# Patient Record
Sex: MALE | Race: WHITE | NOT HISPANIC OR LATINO | Employment: OTHER | URBAN - METROPOLITAN AREA
[De-identification: names, ages, dates, MRNs, and addresses within clinical notes are randomized per-mention and may not be internally consistent; named-entity substitution may affect disease eponyms.]

---

## 2018-12-11 PROBLEM — M1A.09X0 IDIOPATHIC CHRONIC GOUT OF MULTIPLE SITES WITHOUT TOPHUS: Status: ACTIVE | Noted: 2018-12-11

## 2019-01-11 PROBLEM — E03.9 ACQUIRED HYPOTHYROIDISM: Status: ACTIVE | Noted: 2019-01-11

## 2019-01-11 PROBLEM — I25.10 CORONARY ARTERY DISEASE INVOLVING NATIVE CORONARY ARTERY OF NATIVE HEART WITHOUT ANGINA PECTORIS: Status: ACTIVE | Noted: 2019-01-11

## 2019-01-11 PROBLEM — I10 BENIGN ESSENTIAL HTN: Status: ACTIVE | Noted: 2019-01-11

## 2019-01-11 PROBLEM — R74.8 LIVER ENZYME ELEVATION: Status: ACTIVE | Noted: 2019-01-11

## 2019-01-11 PROBLEM — R73.03 PREDIABETES: Status: ACTIVE | Noted: 2019-01-11

## 2020-11-07 PROBLEM — M1A.09X0 IDIOPATHIC CHRONIC GOUT OF MULTIPLE SITES WITHOUT TOPHUS: Chronic | Status: ACTIVE | Noted: 2018-12-11

## 2020-11-07 PROBLEM — I10 BENIGN ESSENTIAL HTN: Chronic | Status: ACTIVE | Noted: 2019-01-11

## 2020-11-07 PROBLEM — R80.9 PROTEINURIA: Chronic | Status: ACTIVE | Noted: 2020-11-07

## 2020-11-07 PROBLEM — I25.10 CORONARY ARTERY DISEASE INVOLVING NATIVE CORONARY ARTERY OF NATIVE HEART WITHOUT ANGINA PECTORIS: Chronic | Status: ACTIVE | Noted: 2019-01-11

## 2020-11-07 PROBLEM — E03.9 ACQUIRED HYPOTHYROIDISM: Chronic | Status: ACTIVE | Noted: 2019-01-11

## 2020-11-23 ENCOUNTER — TELEPHONE (OUTPATIENT)
Dept: NEPHROLOGY | Facility: CLINIC | Age: 65
End: 2020-11-23

## 2020-11-23 ENCOUNTER — LAB VISIT (OUTPATIENT)
Dept: LAB | Facility: HOSPITAL | Age: 65
End: 2020-11-23
Attending: INTERNAL MEDICINE
Payer: MEDICARE

## 2020-11-23 ENCOUNTER — OFFICE VISIT (OUTPATIENT)
Dept: NEPHROLOGY | Facility: CLINIC | Age: 65
End: 2020-11-23
Payer: MEDICARE

## 2020-11-23 VITALS
HEIGHT: 68 IN | WEIGHT: 306.25 LBS | HEART RATE: 59 BPM | DIASTOLIC BLOOD PRESSURE: 86 MMHG | BODY MASS INDEX: 46.41 KG/M2 | OXYGEN SATURATION: 96 % | SYSTOLIC BLOOD PRESSURE: 152 MMHG

## 2020-11-23 DIAGNOSIS — R80.9 PROTEINURIA, UNSPECIFIED TYPE: ICD-10-CM

## 2020-11-23 LAB
BACTERIA #/AREA URNS HPF: ABNORMAL /HPF
BILIRUB UR QL STRIP: NEGATIVE
CLARITY UR: CLEAR
COLOR UR: YELLOW
GLUCOSE UR QL STRIP: NEGATIVE
HGB UR QL STRIP: NEGATIVE
HYALINE CASTS #/AREA URNS LPF: 2 /LPF
KETONES UR QL STRIP: NEGATIVE
LEUKOCYTE ESTERASE UR QL STRIP: NEGATIVE
MICROSCOPIC COMMENT: ABNORMAL
NITRITE UR QL STRIP: NEGATIVE
PH UR STRIP: 6 [PH] (ref 5–8)
PROT UR QL STRIP: ABNORMAL
RBC #/AREA URNS HPF: 1 /HPF (ref 0–4)
SP GR UR STRIP: >=1.03 (ref 1–1.03)
URN SPEC COLLECT METH UR: ABNORMAL
WBC #/AREA URNS HPF: 2 /HPF (ref 0–5)

## 2020-11-23 PROCEDURE — 1100F PR PT FALLS ASSESS DOC 2+ FALLS/FALL W/INJURY/YR: ICD-10-PCS | Mod: CPTII,S$GLB,, | Performed by: INTERNAL MEDICINE

## 2020-11-23 PROCEDURE — 3288F FALL RISK ASSESSMENT DOCD: CPT | Mod: CPTII,S$GLB,, | Performed by: INTERNAL MEDICINE

## 2020-11-23 PROCEDURE — 81000 URINALYSIS NONAUTO W/SCOPE: CPT | Mod: PO

## 2020-11-23 PROCEDURE — 99999 PR PBB SHADOW E&M-NEW PATIENT-LVL III: CPT | Mod: PBBFAC,,, | Performed by: INTERNAL MEDICINE

## 2020-11-23 PROCEDURE — 99204 OFFICE O/P NEW MOD 45 MIN: CPT | Mod: S$GLB,,, | Performed by: INTERNAL MEDICINE

## 2020-11-23 PROCEDURE — 3079F DIAST BP 80-89 MM HG: CPT | Mod: CPTII,S$GLB,, | Performed by: INTERNAL MEDICINE

## 2020-11-23 PROCEDURE — 99999 PR PBB SHADOW E&M-NEW PATIENT-LVL III: ICD-10-PCS | Mod: PBBFAC,,, | Performed by: INTERNAL MEDICINE

## 2020-11-23 PROCEDURE — 84156 ASSAY OF PROTEIN URINE: CPT

## 2020-11-23 PROCEDURE — 1159F MED LIST DOCD IN RCRD: CPT | Mod: S$GLB,,, | Performed by: INTERNAL MEDICINE

## 2020-11-23 PROCEDURE — 3079F PR MOST RECENT DIASTOLIC BLOOD PRESSURE 80-89 MM HG: ICD-10-PCS | Mod: CPTII,S$GLB,, | Performed by: INTERNAL MEDICINE

## 2020-11-23 PROCEDURE — 3077F PR MOST RECENT SYSTOLIC BLOOD PRESSURE >= 140 MM HG: ICD-10-PCS | Mod: CPTII,S$GLB,, | Performed by: INTERNAL MEDICINE

## 2020-11-23 PROCEDURE — 1159F PR MEDICATION LIST DOCUMENTED IN MEDICAL RECORD: ICD-10-PCS | Mod: S$GLB,,, | Performed by: INTERNAL MEDICINE

## 2020-11-23 PROCEDURE — 3008F BODY MASS INDEX DOCD: CPT | Mod: CPTII,S$GLB,, | Performed by: INTERNAL MEDICINE

## 2020-11-23 PROCEDURE — 3077F SYST BP >= 140 MM HG: CPT | Mod: CPTII,S$GLB,, | Performed by: INTERNAL MEDICINE

## 2020-11-23 PROCEDURE — 99204 PR OFFICE/OUTPT VISIT, NEW, LEVL IV, 45-59 MIN: ICD-10-PCS | Mod: S$GLB,,, | Performed by: INTERNAL MEDICINE

## 2020-11-23 PROCEDURE — 3008F PR BODY MASS INDEX (BMI) DOCUMENTED: ICD-10-PCS | Mod: CPTII,S$GLB,, | Performed by: INTERNAL MEDICINE

## 2020-11-23 PROCEDURE — 1100F PTFALLS ASSESS-DOCD GE2>/YR: CPT | Mod: CPTII,S$GLB,, | Performed by: INTERNAL MEDICINE

## 2020-11-23 PROCEDURE — 3288F PR FALLS RISK ASSESSMENT DOCUMENTED: ICD-10-PCS | Mod: CPTII,S$GLB,, | Performed by: INTERNAL MEDICINE

## 2020-11-23 NOTE — PROGRESS NOTES
Subjective:       Patient ID: Scott Renteria is a 65 y.o. White male who presents for new patient evaluation for proteinuria.    Scott Renteria is referred by SCOTT Blunt Jr, MD to be evaluated for proteinuria.  He was found to have an elevated urine protein on his urine recently.  He admits that he is not much of a water drinker.  He has no uremic or urinary symptoms and is in his usual state of health.  There have been no recent illnesses, hospitalizations or procedures.  He does not regularly take NSAIDs.          Review of Systems   Constitutional: Negative for appetite change, chills and fever.   HENT: Negative for congestion.    Eyes: Negative for visual disturbance.   Respiratory: Negative for cough and shortness of breath.    Cardiovascular: Negative for chest pain and leg swelling.   Gastrointestinal: Negative for abdominal pain, diarrhea, nausea and vomiting.   Genitourinary: Negative for difficulty urinating, dysuria and hematuria.   Musculoskeletal: Positive for arthralgias (R knee). Negative for myalgias.   Skin: Negative for rash.   Neurological: Negative for headaches.   Psychiatric/Behavioral: Positive for sleep disturbance.       The past medical, family and social histories were reviewed for this encounter.     Past Medical History:   Diagnosis Date    Coronary artery disease     Gout     Hypertension     Hypothyroidism      Past Surgical History:   Procedure Laterality Date    CARDIAC CATHETERIZATION      CORONARY ARTERY BYPASS GRAFT      open heart surgery  2001    TONSILLECTOMY       Social History     Socioeconomic History    Marital status:      Spouse name: Not on file    Number of children: Not on file    Years of education: Not on file    Highest education level: Not on file   Occupational History    Not on file   Social Needs    Financial resource strain: Not on file    Food insecurity     Worry: Not on file     Inability: Not on file    Transportation needs      "Medical: Not on file     Non-medical: Not on file   Tobacco Use    Smoking status: Never Smoker    Smokeless tobacco: Never Used   Substance and Sexual Activity    Alcohol use: No    Drug use: Never    Sexual activity: Yes     Partners: Female     Birth control/protection: None   Lifestyle    Physical activity     Days per week: Not on file     Minutes per session: Not on file    Stress: Rather much   Relationships    Social connections     Talks on phone: Not on file     Gets together: Not on file     Attends Confucianism service: Not on file     Active member of club or organization: Not on file     Attends meetings of clubs or organizations: Not on file     Relationship status: Not on file   Other Topics Concern    Not on file   Social History Narrative    Not on file     Current Outpatient Medications   Medication Sig    NAPROXEN ORAL Take by mouth. Replaced ibuprofen    allopurinol (ZYLOPRIM) 300 MG tablet Take 2 po daily    clopidogrel (PLAVIX) 75 mg tablet Take 75 mg by mouth once daily.    colchicine (COLCRYS) 0.6 mg tablet 1 now and repeat in 2 hrs if necessary. Then 1 daily starting tomorrow.    diclofenac sodium (VOLTAREN) 1 % Gel Apply 2 g topically 2 (two) times daily.    dutasteride (AVODART) 0.5 mg capsule Take 0.5 mg by mouth once daily.    evolocumab (REPATHA PUSHTRONEX) 420 mg/3.5 mL Injt Inject into the skin every 21 days.     fosinopril (MONOPRIL) 20 MG tablet Take 20 mg by mouth once daily.    hydroCHLOROthiazide (HYDRODIURIL) 25 MG tablet Take 25 mg by mouth once daily.    levothyroxine (SYNTHROID) 50 MCG tablet Take 50 mcg by mouth once daily.    tamsulosin (FLOMAX) 0.4 mg Cap Take 1 capsule by mouth once daily.    testosterone (ANDROGEL) 20.25 mg/1.25 gram (1.62 %) GlPm Place 2 Pump onto the skin Daily.     No current facility-administered medications for this visit.        BP (!) 152/86   Pulse (!) 59   Ht 5' 8" (1.727 m)   Wt (!) 138.9 kg (306 lb 3.5 oz)   SpO2 96%   " BMI 46.56 kg/m²     Objective:      Physical Exam  Vitals signs reviewed.   Constitutional:       General: He is not in acute distress.     Appearance: He is well-developed.   HENT:      Head: Normocephalic and atraumatic.   Eyes:      General: No scleral icterus.     Conjunctiva/sclera: Conjunctivae normal.   Neck:      Musculoskeletal: Normal range of motion.      Vascular: No JVD.   Cardiovascular:      Rate and Rhythm: Normal rate and regular rhythm.      Heart sounds: Normal heart sounds. No murmur. No friction rub. No gallop.    Pulmonary:      Effort: Pulmonary effort is normal. No respiratory distress.      Breath sounds: Normal breath sounds. No wheezing or rales.   Abdominal:      General: Bowel sounds are normal. There is no distension.      Palpations: Abdomen is soft.      Tenderness: There is no abdominal tenderness.   Musculoskeletal:      Right lower leg: Edema (1-2+) present.      Left lower leg: Edema (1-2+) present.   Skin:     General: Skin is warm and dry.      Findings: No rash.   Neurological:      Mental Status: He is alert and oriented to person, place, and time.   Psychiatric:         Mood and Affect: Mood normal.         Behavior: Behavior normal.         Assessment:       1. Proteinuria, unspecified type        Plan:   Return to clinic in 6 months.  Labs for next visit include rp, upc, uric acid.  UA and UPC this week.  Baseline creatinine is 0.9.  Uric acid level is 5.9.  I plan to quantitate his urine protein.  If positive then I will get an additional reading to confirm.  His dipstick urine proteins may have been mildly positive due to the fact the specimens were concentrated.

## 2020-11-23 NOTE — TELEPHONE ENCOUNTER
----- Message from Cristóbal Cuvler sent at 11/23/2020  1:22 PM CST -----  Regarding: appt advice  Contact: pt  Type:  Patient Returning Call    Who Called:  pt  Does the patient know what this is regarding?:  please follow up with pt regarding appt dont feel can make appt is in another doctors office  Best Call Back Number:    Additional Information:  Thank you

## 2020-11-24 ENCOUNTER — TELEPHONE (OUTPATIENT)
Dept: NEPHROLOGY | Facility: CLINIC | Age: 65
End: 2020-11-24

## 2020-11-24 DIAGNOSIS — R80.9 PROTEINURIA, UNSPECIFIED TYPE: Primary | ICD-10-CM

## 2020-11-24 DIAGNOSIS — I10 BENIGN ESSENTIAL HTN: Chronic | ICD-10-CM

## 2020-11-24 LAB
CREAT UR-MCNC: 215 MG/DL (ref 23–375)
PROT UR-MCNC: 102 MG/DL (ref 0–15)
PROT/CREAT UR: 0.47 MG/G{CREAT} (ref 0–0.2)

## 2020-11-24 NOTE — TELEPHONE ENCOUNTER
Patient voiced understanding re: urine results and will repeat at his convenience in the next 2 weeks at [facility] or [facility] when he is well-hydrated.

## 2020-11-24 NOTE — TELEPHONE ENCOUNTER
----- Message from Crescencio Moran MD sent at 11/24/2020 10:23 AM CST -----  Let him know he has minimal protein in his urine.  I would like to repeat his UPC and UA after he is better hydrated.

## 2021-01-05 ENCOUNTER — TELEPHONE (OUTPATIENT)
Dept: NEPHROLOGY | Facility: CLINIC | Age: 66
End: 2021-01-05

## 2021-05-21 ENCOUNTER — LAB VISIT (OUTPATIENT)
Dept: LAB | Facility: HOSPITAL | Age: 66
End: 2021-05-21
Attending: INTERNAL MEDICINE
Payer: MEDICARE

## 2021-05-21 DIAGNOSIS — R80.9 PROTEINURIA, UNSPECIFIED TYPE: ICD-10-CM

## 2021-05-21 LAB
ALBUMIN SERPL BCP-MCNC: 3.6 G/DL (ref 3.5–5.2)
ANION GAP SERPL CALC-SCNC: 9 MMOL/L (ref 8–16)
BUN SERPL-MCNC: 20 MG/DL (ref 8–23)
CALCIUM SERPL-MCNC: 9.6 MG/DL (ref 8.7–10.5)
CHLORIDE SERPL-SCNC: 102 MMOL/L (ref 95–110)
CO2 SERPL-SCNC: 26 MMOL/L (ref 23–29)
CREAT SERPL-MCNC: 1.1 MG/DL (ref 0.5–1.4)
EST. GFR  (AFRICAN AMERICAN): >60 ML/MIN/1.73 M^2
EST. GFR  (NON AFRICAN AMERICAN): >60 ML/MIN/1.73 M^2
GLUCOSE SERPL-MCNC: 110 MG/DL (ref 70–110)
PHOSPHATE SERPL-MCNC: 3.1 MG/DL (ref 2.7–4.5)
POTASSIUM SERPL-SCNC: 4 MMOL/L (ref 3.5–5.1)
SODIUM SERPL-SCNC: 137 MMOL/L (ref 136–145)
URATE SERPL-MCNC: 5.4 MG/DL (ref 3.4–7)

## 2021-05-21 PROCEDURE — 84550 ASSAY OF BLOOD/URIC ACID: CPT | Performed by: INTERNAL MEDICINE

## 2021-05-21 PROCEDURE — 80069 RENAL FUNCTION PANEL: CPT | Performed by: INTERNAL MEDICINE

## 2021-05-21 PROCEDURE — 36415 COLL VENOUS BLD VENIPUNCTURE: CPT | Mod: PO | Performed by: INTERNAL MEDICINE

## 2021-05-25 ENCOUNTER — OFFICE VISIT (OUTPATIENT)
Dept: NEPHROLOGY | Facility: CLINIC | Age: 66
End: 2021-05-25
Payer: MEDICARE

## 2021-05-25 VITALS
BODY MASS INDEX: 46.97 KG/M2 | OXYGEN SATURATION: 94 % | SYSTOLIC BLOOD PRESSURE: 130 MMHG | DIASTOLIC BLOOD PRESSURE: 70 MMHG | HEART RATE: 65 BPM | WEIGHT: 309.94 LBS | HEIGHT: 68 IN

## 2021-05-25 DIAGNOSIS — R80.9 PROTEINURIA, UNSPECIFIED TYPE: Primary | ICD-10-CM

## 2021-05-25 DIAGNOSIS — I10 BENIGN ESSENTIAL HTN: ICD-10-CM

## 2021-05-25 PROCEDURE — 1159F PR MEDICATION LIST DOCUMENTED IN MEDICAL RECORD: ICD-10-PCS | Mod: S$GLB,,, | Performed by: INTERNAL MEDICINE

## 2021-05-25 PROCEDURE — 3078F DIAST BP <80 MM HG: CPT | Mod: CPTII,S$GLB,, | Performed by: INTERNAL MEDICINE

## 2021-05-25 PROCEDURE — 99214 PR OFFICE/OUTPT VISIT, EST, LEVL IV, 30-39 MIN: ICD-10-PCS | Mod: S$GLB,,, | Performed by: INTERNAL MEDICINE

## 2021-05-25 PROCEDURE — 3288F PR FALLS RISK ASSESSMENT DOCUMENTED: ICD-10-PCS | Mod: CPTII,S$GLB,, | Performed by: INTERNAL MEDICINE

## 2021-05-25 PROCEDURE — 3008F PR BODY MASS INDEX (BMI) DOCUMENTED: ICD-10-PCS | Mod: CPTII,S$GLB,, | Performed by: INTERNAL MEDICINE

## 2021-05-25 PROCEDURE — 1159F MED LIST DOCD IN RCRD: CPT | Mod: S$GLB,,, | Performed by: INTERNAL MEDICINE

## 2021-05-25 PROCEDURE — 3078F PR MOST RECENT DIASTOLIC BLOOD PRESSURE < 80 MM HG: ICD-10-PCS | Mod: CPTII,S$GLB,, | Performed by: INTERNAL MEDICINE

## 2021-05-25 PROCEDURE — 99214 OFFICE O/P EST MOD 30 MIN: CPT | Mod: S$GLB,,, | Performed by: INTERNAL MEDICINE

## 2021-05-25 PROCEDURE — 99999 PR PBB SHADOW E&M-EST. PATIENT-LVL III: CPT | Mod: PBBFAC,,, | Performed by: INTERNAL MEDICINE

## 2021-05-25 PROCEDURE — 1101F PT FALLS ASSESS-DOCD LE1/YR: CPT | Mod: CPTII,S$GLB,, | Performed by: INTERNAL MEDICINE

## 2021-05-25 PROCEDURE — 99999 PR PBB SHADOW E&M-EST. PATIENT-LVL III: ICD-10-PCS | Mod: PBBFAC,,, | Performed by: INTERNAL MEDICINE

## 2021-05-25 PROCEDURE — 3008F BODY MASS INDEX DOCD: CPT | Mod: CPTII,S$GLB,, | Performed by: INTERNAL MEDICINE

## 2021-05-25 PROCEDURE — 3288F FALL RISK ASSESSMENT DOCD: CPT | Mod: CPTII,S$GLB,, | Performed by: INTERNAL MEDICINE

## 2021-05-25 PROCEDURE — 3075F PR MOST RECENT SYSTOLIC BLOOD PRESS GE 130-139MM HG: ICD-10-PCS | Mod: CPTII,S$GLB,, | Performed by: INTERNAL MEDICINE

## 2021-05-25 PROCEDURE — 3075F SYST BP GE 130 - 139MM HG: CPT | Mod: CPTII,S$GLB,, | Performed by: INTERNAL MEDICINE

## 2021-05-25 PROCEDURE — 1101F PR PT FALLS ASSESS DOC 0-1 FALLS W/OUT INJ PAST YR: ICD-10-PCS | Mod: CPTII,S$GLB,, | Performed by: INTERNAL MEDICINE

## 2021-05-25 RX ORDER — LEVOTHYROXINE SODIUM 100 UG/1
100 TABLET ORAL DAILY
COMMUNITY
Start: 2021-03-11 | End: 2022-07-29 | Stop reason: DRUGHIGH

## 2021-05-25 RX ORDER — EVOLOCUMAB 140 MG/ML
INJECTION, SOLUTION SUBCUTANEOUS
COMMUNITY
Start: 2021-05-03

## 2022-02-21 ENCOUNTER — LAB VISIT (OUTPATIENT)
Dept: LAB | Facility: HOSPITAL | Age: 67
End: 2022-02-21
Attending: INTERNAL MEDICINE
Payer: MEDICARE

## 2022-02-21 DIAGNOSIS — R80.9 PROTEINURIA, UNSPECIFIED TYPE: ICD-10-CM

## 2022-02-21 LAB
CREAT UR-MCNC: 189 MG/DL (ref 23–375)
PROT UR-MCNC: 93 MG/DL (ref 0–15)
PROT/CREAT UR: 0.49 MG/G{CREAT} (ref 0–0.2)

## 2022-02-21 PROCEDURE — 82570 ASSAY OF URINE CREATININE: CPT | Performed by: INTERNAL MEDICINE

## 2022-02-23 ENCOUNTER — TELEPHONE (OUTPATIENT)
Dept: NEPHROLOGY | Facility: CLINIC | Age: 67
End: 2022-02-23
Payer: MEDICARE

## 2022-02-23 NOTE — TELEPHONE ENCOUNTER
----- Message from Tiffanie Jain sent at 2/23/2022 11:15 AM CST -----  Contact: SAMY MACHADO [[id number]] [phone]  GENERAL CALL BACK    Pt wants to reschedule his 9-month follow up that was originally scheduled for 2/28/22. He'd like to reschedule for that same day, but later in the afternoon, and requests a phone call visit (audio only) if possible. [facility] and does not want to set [facility] up. He is awaiting a phone call back at [phone] with whether his request can be accommodated.

## 2022-07-06 PROBLEM — N40.1 BENIGN PROSTATIC HYPERPLASIA WITH URINARY HESITANCY: Chronic | Status: ACTIVE | Noted: 2022-07-06

## 2022-07-06 PROBLEM — R73.03 PREDIABETES: Chronic | Status: ACTIVE | Noted: 2019-01-11

## 2022-07-06 PROBLEM — R39.11 BENIGN PROSTATIC HYPERPLASIA WITH URINARY HESITANCY: Chronic | Status: ACTIVE | Noted: 2022-07-06

## 2022-09-27 ENCOUNTER — TELEPHONE (OUTPATIENT)
Dept: GASTROENTEROLOGY | Facility: CLINIC | Age: 67
End: 2022-09-27
Payer: MEDICARE

## 2022-09-27 NOTE — TELEPHONE ENCOUNTER
Received referral to schedule pt appt in clinic from NP. Called pt and scheduled f/u and scheduled c-scope. Prep mailed to pts house. Pt verbalized understanding

## 2022-10-26 ENCOUNTER — TELEPHONE (OUTPATIENT)
Dept: GASTROENTEROLOGY | Facility: CLINIC | Age: 67
End: 2022-10-26
Payer: MEDICARE

## 2022-10-26 NOTE — TELEPHONE ENCOUNTER
Left message for pt to return call to clinic to discuss rescheduling procedure to this year. Number provided.

## 2022-10-26 NOTE — TELEPHONE ENCOUNTER
----- Message from Rebeka Alex sent at 10/26/2022  9:07 AM CDT -----  Type: Needs Medical Advice  Who Called:  pt  Symptoms (please be specific):  pt said he need someone in the call him he need to reschedule his colonoscopy--he need to have the procedure done before the year is out--please call and advise   Best Call Back Number: [phone] (home)     Additional Information: thank you

## 2022-10-26 NOTE — TELEPHONE ENCOUNTER
Spoke with pt. Pt is going to contact his insurance before rescheduling. If he needs to reschedule, he will call back.

## 2022-12-21 PROBLEM — G47.33 OSA (OBSTRUCTIVE SLEEP APNEA): Status: ACTIVE | Noted: 2022-12-21

## 2022-12-21 PROBLEM — E66.01 MORBID OBESITY: Status: ACTIVE | Noted: 2018-07-23

## 2022-12-29 ENCOUNTER — TELEPHONE (OUTPATIENT)
Dept: SURGERY | Facility: HOSPITAL | Age: 67
End: 2022-12-29
Payer: MEDICARE

## 2022-12-29 NOTE — TELEPHONE ENCOUNTER
Good morning,     Mr. Renteria is currently ill and would like to reschedule his colonoscopy with Dr. Mayorga on 1/3/23. Please contact him regarding rescheduling.     Thank you!

## 2022-12-29 NOTE — TELEPHONE ENCOUNTER
Left message for pt to return call to clinic to reschedule procedure once he is feeling better. Number provided.

## 2023-01-05 ENCOUNTER — TELEPHONE (OUTPATIENT)
Dept: GASTROENTEROLOGY | Facility: CLINIC | Age: 68
End: 2023-01-05
Payer: MEDICARE

## 2023-01-05 NOTE — TELEPHONE ENCOUNTER
Spoke with pt. Rescheduled procedure. Pt still had a copy of his prep instructions. Pt informed to hold plavix 5 days prior. Pt verbalized understanding to all.

## 2023-01-16 PROBLEM — Z95.1 HX OF CABG: Status: ACTIVE | Noted: 2023-01-16

## 2023-01-16 PROBLEM — E78.00 PURE HYPERCHOLESTEROLEMIA: Status: ACTIVE | Noted: 2023-01-16

## 2023-01-31 ENCOUNTER — HOSPITAL ENCOUNTER (OUTPATIENT)
Facility: HOSPITAL | Age: 68
Discharge: HOME | End: 2023-01-31
Attending: INTERNAL MEDICINE | Admitting: INTERNAL MEDICINE
Payer: MEDICARE

## 2023-01-31 ENCOUNTER — ANESTHESIA EVENT (OUTPATIENT)
Dept: ENDOSCOPY | Facility: HOSPITAL | Age: 68
End: 2023-01-31
Payer: MEDICARE

## 2023-01-31 ENCOUNTER — ANESTHESIA (OUTPATIENT)
Dept: ENDOSCOPY | Facility: HOSPITAL | Age: 68
End: 2023-01-31
Payer: MEDICARE

## 2023-01-31 ENCOUNTER — TELEPHONE (OUTPATIENT)
Dept: GASTROENTEROLOGY | Facility: CLINIC | Age: 68
End: 2023-01-31
Payer: MEDICARE

## 2023-01-31 VITALS
TEMPERATURE: 98 F | BODY MASS INDEX: 40.92 KG/M2 | SYSTOLIC BLOOD PRESSURE: 133 MMHG | RESPIRATION RATE: 16 BRPM | HEART RATE: 50 BPM | HEIGHT: 68 IN | OXYGEN SATURATION: 95 % | DIASTOLIC BLOOD PRESSURE: 65 MMHG | WEIGHT: 270 LBS

## 2023-01-31 DIAGNOSIS — Z12.11 SCREEN FOR COLON CANCER: ICD-10-CM

## 2023-01-31 PROCEDURE — 37000008 HC ANESTHESIA 1ST 15 MINUTES: Mod: PO | Performed by: INTERNAL MEDICINE

## 2023-01-31 PROCEDURE — 27201089 HC SNARE, DISP (ANY): Mod: PO | Performed by: INTERNAL MEDICINE

## 2023-01-31 PROCEDURE — 25000003 PHARM REV CODE 250: Mod: PO | Performed by: NURSE ANESTHETIST, CERTIFIED REGISTERED

## 2023-01-31 PROCEDURE — 63600175 PHARM REV CODE 636 W HCPCS: Mod: PO | Performed by: NURSE ANESTHETIST, CERTIFIED REGISTERED

## 2023-01-31 PROCEDURE — 45385 PR COLONOSCOPY,REMV LESN,SNARE: ICD-10-PCS | Mod: PT,,, | Performed by: INTERNAL MEDICINE

## 2023-01-31 PROCEDURE — 45380 COLONOSCOPY AND BIOPSY: CPT | Mod: PT,59,PO | Performed by: INTERNAL MEDICINE

## 2023-01-31 PROCEDURE — 45385 COLONOSCOPY W/LESION REMOVAL: CPT | Mod: PT,,, | Performed by: INTERNAL MEDICINE

## 2023-01-31 PROCEDURE — 45385 COLONOSCOPY W/LESION REMOVAL: CPT | Mod: PT,PO | Performed by: INTERNAL MEDICINE

## 2023-01-31 PROCEDURE — 45380 COLONOSCOPY AND BIOPSY: CPT | Mod: PT,59,, | Performed by: INTERNAL MEDICINE

## 2023-01-31 PROCEDURE — 27201012 HC FORCEPS, HOT/COLD, DISP: Mod: PO | Performed by: INTERNAL MEDICINE

## 2023-01-31 PROCEDURE — D9220A PRA ANESTHESIA: Mod: PT,CRNA,, | Performed by: NURSE ANESTHETIST, CERTIFIED REGISTERED

## 2023-01-31 PROCEDURE — 63600175 PHARM REV CODE 636 W HCPCS: Mod: PO | Performed by: INTERNAL MEDICINE

## 2023-01-31 PROCEDURE — D9220A PRA ANESTHESIA: Mod: PT,ANES,, | Performed by: ANESTHESIOLOGY

## 2023-01-31 PROCEDURE — 88305 TISSUE EXAM BY PATHOLOGIST: CPT | Mod: 26,,, | Performed by: PATHOLOGY

## 2023-01-31 PROCEDURE — 88305 TISSUE EXAM BY PATHOLOGIST: CPT | Performed by: PATHOLOGY

## 2023-01-31 PROCEDURE — 88305 TISSUE EXAM BY PATHOLOGIST: ICD-10-PCS | Mod: 26,,, | Performed by: PATHOLOGY

## 2023-01-31 PROCEDURE — 37000009 HC ANESTHESIA EA ADD 15 MINS: Mod: PO | Performed by: INTERNAL MEDICINE

## 2023-01-31 PROCEDURE — D9220A PRA ANESTHESIA: ICD-10-PCS | Mod: PT,ANES,, | Performed by: ANESTHESIOLOGY

## 2023-01-31 PROCEDURE — D9220A PRA ANESTHESIA: ICD-10-PCS | Mod: PT,CRNA,, | Performed by: NURSE ANESTHETIST, CERTIFIED REGISTERED

## 2023-01-31 PROCEDURE — 45380 PR COLONOSCOPY,BIOPSY: ICD-10-PCS | Mod: PT,59,, | Performed by: INTERNAL MEDICINE

## 2023-01-31 RX ORDER — SODIUM CHLORIDE 0.9 % (FLUSH) 0.9 %
10 SYRINGE (ML) INJECTION
Status: DISCONTINUED | OUTPATIENT
Start: 2023-01-31 | End: 2023-01-31 | Stop reason: HOSPADM

## 2023-01-31 RX ORDER — PROPOFOL 10 MG/ML
VIAL (ML) INTRAVENOUS
Status: DISCONTINUED | OUTPATIENT
Start: 2023-01-31 | End: 2023-01-31

## 2023-01-31 RX ORDER — LIDOCAINE HCL/PF 100 MG/5ML
SYRINGE (ML) INTRAVENOUS
Status: DISCONTINUED | OUTPATIENT
Start: 2023-01-31 | End: 2023-01-31

## 2023-01-31 RX ORDER — SODIUM CHLORIDE, SODIUM LACTATE, POTASSIUM CHLORIDE, CALCIUM CHLORIDE 600; 310; 30; 20 MG/100ML; MG/100ML; MG/100ML; MG/100ML
INJECTION, SOLUTION INTRAVENOUS CONTINUOUS
Status: DISCONTINUED | OUTPATIENT
Start: 2023-01-31 | End: 2023-01-31 | Stop reason: HOSPADM

## 2023-01-31 RX ADMIN — SODIUM CHLORIDE, POTASSIUM CHLORIDE, SODIUM LACTATE AND CALCIUM CHLORIDE: 600; 310; 30; 20 INJECTION, SOLUTION INTRAVENOUS at 10:01

## 2023-01-31 RX ADMIN — LIDOCAINE HYDROCHLORIDE 100 MG: 20 INJECTION, SOLUTION INTRAVENOUS at 10:01

## 2023-01-31 RX ADMIN — PROPOFOL 30 MG: 10 INJECTION, EMULSION INTRAVENOUS at 10:01

## 2023-01-31 RX ADMIN — PROPOFOL 30 MG: 10 INJECTION, EMULSION INTRAVENOUS at 11:01

## 2023-01-31 RX ADMIN — PROPOFOL 90 MG: 10 INJECTION, EMULSION INTRAVENOUS at 10:01

## 2023-01-31 NOTE — TRANSFER OF CARE
"Anesthesia Transfer of Care Note    Patient: Scott Renteria    Procedure(s) Performed: Procedure(s) (LRB):  COLONOSCOPY (N/A)    Patient location: PACU    Anesthesia Type: general    Transport from OR: Transported from OR on 2-3 L/min O2 by NC with adequate spontaneous ventilation    Post pain: adequate analgesia    Post assessment: no apparent anesthetic complications and tolerated procedure well    Post vital signs: stable    Level of consciousness: awake    Nausea/Vomiting: no nausea/vomiting    Complications: none    Transfer of care protocol was followed      Last vitals:   Visit Vitals  BP (!) 113/59   Pulse 61   Temp 36.9 °C (98.4 °F) (Skin)   Resp 18   Ht 5' 8" (1.727 m)   Wt 122.5 kg (270 lb)   SpO2 97%   BMI 41.05 kg/m²     "

## 2023-01-31 NOTE — ANESTHESIA POSTPROCEDURE EVALUATION
Anesthesia Post Evaluation    Patient: Scott Renteria    Procedure(s) Performed: Procedure(s) (LRB):  COLONOSCOPY (N/A)    Final Anesthesia Type: general      Patient location during evaluation: PACU  Patient participation: Yes- Able to Participate  Level of consciousness: awake and alert and oriented  Post-procedure vital signs: reviewed and stable  Pain management: adequate  Airway patency: patent    PONV status at discharge: No PONV  Anesthetic complications: no      Cardiovascular status: blood pressure returned to baseline  Respiratory status: unassisted, spontaneous ventilation and room air  Hydration status: euvolemic  Follow-up not needed.          Vitals Value Taken Time   /65 01/31/23 1132   Temp 36.9 °C (98.4 °F) 01/31/23 1117   Pulse 50 01/31/23 1132   Resp 16 01/31/23 1132   SpO2 95 % 01/31/23 1132         Event Time   Out of Recovery 11:46:56         Pain/Shira Score: Shira Score: 10 (1/31/2023 11:35 AM)

## 2023-01-31 NOTE — ANESTHESIA PREPROCEDURE EVALUATION
01/31/2023  Scott Renteria is a 67 y.o., male.      Pre-op Assessment    I have reviewed the Patient Summary Reports.     I have reviewed the Nursing Notes. I have reviewed the NPO Status.   I have reviewed the Medications.     Review of Systems  Cardiovascular:   Hypertension CAD asymptomatic CABG/stent     Pulmonary:   Sleep Apnea    Endocrine:   Hypothyroidism        Physical Exam  General: Well nourished, Cooperative, Alert and Oriented  MORBIDLY OBESE    Airway:  Mallampati: II / II  Mouth Opening: Normal  TM Distance: 4 - 6 cm  Tongue: Normal    Dental:  Intact    Chest/Lungs:  Clear to auscultation, Normal Respiratory Rate    Heart:  Rate: Normal  Rhythm: Regular Rhythm  Sounds: Normal        Anesthesia Plan  Type of Anesthesia, risks & benefits discussed:    Anesthesia Type: Gen Natural Airway  Intra-op Monitoring Plan: Standard ASA Monitors  Induction:  IV  Informed Consent: Informed consent signed with the Patient and all parties understand the risks and agree with anesthesia plan.  All questions answered.   ASA Score: 3    Ready For Surgery From Anesthesia Perspective.     .

## 2023-01-31 NOTE — H&P
"History & Physical - Short Stay  Gastroenterology      SUBJECTIVE:     Procedure: Colonoscopy    Chief Complaint/Indication for Procedure: Screening    PTA Medications   Medication Sig    allopurinol (ZYLOPRIM) 300 MG tablet Take 2 po daily    clopidogrel (PLAVIX) 75 mg tablet Take 75 mg by mouth once daily.    fosinopril (MONOPRIL) 20 MG tablet Take 20 mg by mouth once daily.    hydroCHLOROthiazide (HYDRODIURIL) 25 MG tablet Take 25 mg by mouth once daily.    levothyroxine (SYNTHROID) 125 MCG tablet TAKE 1 TABLET BY MOUTH BEFORE BREAKFAST.    REPATHA SYRINGE 140 mg/mL Syrg INJECT 1 ML EVERY 3 WEEKS    tamsulosin (FLOMAX) 0.4 mg Cap Take 1 capsule by mouth once daily.    tirzepatide (MOUNJARO) 2.5 mg/0.5 mL PnIj Inject 2.5 mg into the skin every 7 days.    BD ULTRA-FINE MICRO PEN NEEDLE 32 gauge x 1/4" Ndle USE AS DIRECTED WEEKLY WITH OZEMPIC    colchicine (COLCRYS) 0.6 mg tablet 1 now and repeat in 2 hrs if necessary. Then 1 daily starting tomorrow.    diclofenac sodium (VOLTAREN) 1 % Gel Apply 2 g topically 2 (two) times daily.    doxycycline (VIBRAMYCIN) 100 MG Cap Take 1 capsule (100 mg total) by mouth every 12 (twelve) hours.    dutasteride (AVODART) 0.5 mg capsule Take 0.5 mg by mouth once daily.    ondansetron (ZOFRAN-ODT) 8 MG TbDL TAKE 1 TABLET BY MOUTH EVERY 8 HOURS FOR NAUSEA AND VOMITING    testosterone (ANDROGEL) 20.25 mg/1.25 gram (1.62 %) GlPm Place 2 Pump onto the skin Daily.       Review of patient's allergies indicates:   Allergen Reactions    Statins-hmg-coa reductase inhibitors      Myalgia; pt now on Repatha        Past Medical History:   Diagnosis Date    Coronary artery disease     Gout     Hypertension     Hypothyroidism     HARDEEP (obstructive sleep apnea) 12/21/2022     Past Surgical History:   Procedure Laterality Date    CARDIAC CATHETERIZATION      CORONARY ARTERY BYPASS GRAFT      open heart surgery  2001    TONSILLECTOMY       Family History   Problem Relation Age of Onset    Heart " disease Mother         stents    Thyroid disease Mother     Alcohol abuse Father      Social History     Tobacco Use    Smoking status: Never    Smokeless tobacco: Never   Substance Use Topics    Alcohol use: No    Drug use: Never         OBJECTIVE:     Vital Signs (Most Recent)       Physical Exam                                                        GENERAL:  Comfortable, in no acute distress.                                 HEENT EXAM:  Nonicteric.  No adenopathy.  Oropharynx is clear.               NECK:  Supple.                                                               LUNGS:  Clear.                                                               CARDIAC:  Regular rate and rhythm.  S1, S2.  No murmur.                      ABDOMEN:  Soft, positive bowel sounds, nontender.  No hepatosplenomegaly or masses.  No rebound or guarding.                                             EXTREMITIES:  No edema.     MENTAL STATUS:  Normal, alert and oriented.      ASSESSMENT/PLAN:     Assessment: Colorectal cancer screening    Plan: Colonoscopy    Anesthesia Plan: General    ASA Grade: ASA 2 - Patient with mild systemic disease with no functional limitations    MALLAMPATI SCORE:  I (soft palate, uvula, fauces, and tonsillar pillars visible)

## 2023-01-31 NOTE — TELEPHONE ENCOUNTER
----- Message from Keri Stout sent at 1/31/2023  8:56 AM CST -----  Contact: Wife  Patient is scheduled for a c-scope this morning and was calling to see if there is anything he can or can't take for a headache. Can we please call him back at [phone] to advise ASAP. Thank You.

## 2023-01-31 NOTE — PROVATION PATIENT INSTRUCTIONS
Discharge Summary/Instructions after an Endoscopic Procedure  Patient Name: Scott Renteria  Patient MRN: [id number]  Patient YOB: 1955 Tuesday, January 31, 2023  Brett Mayorga MD  Dear patient,  As a result of recent federal legislation (The Federal Cures Act), you may   receive lab or pathology results from your procedure in your [facility]   account before your physician is able to contact you. Your physician or   their representative will relay the results to you with their   recommendations at their soonest availability.  Thank you,  RESTRICTIONS:  During your procedure today, you received medications for sedation.  These   medications may affect your judgment, balance and coordination.  Therefore,   for 24 hours, you have the following restrictions:   - DO NOT drive a car, operate machinery, make legal/financial decisions,   sign important papers or drink alcohol.    ACTIVITY:  Today: no heavy lifting, straining or running due to procedural   sedation/anesthesia.  The following day: return to full activity including work.  DIET:  Eat and drink normally unless instructed otherwise.     TREATMENT FOR COMMON SIDE EFFECTS:  - Mild abdominal pain, nausea, belching, bloating or excessive gas:  rest,   eat lightly and use a heating pad.  - Sore Throat: treat with throat lozenges and/or gargle with warm salt   water.  - Because air was used during the procedure, expelling large amounts of air   from your rectum or belching is normal.  - If a bowel prep was taken, you may not have a bowel movement for 1-3 days.    This is normal.  SYMPTOMS TO WATCH FOR AND REPORT TO YOUR PHYSICIAN:  1. Abdominal pain or bloating, other than gas cramps.  2. Chest pain.  3. Back pain.  4. Signs of infection such as: chills or fever occurring within 24 hours   after the procedure.  5. Rectal bleeding, which would show as bright red, maroon, or black stools.   (A tablespoon of blood from the rectum is not serious, especially  if   hemorrhoids are present.)  6. Vomiting.  7. Weakness or dizziness.  GO DIRECTLY TO THE NEAREST EMERGENCY ROOM IF YOU HAVE ANY OF THE FOLLOWING:      Difficulty breathing              Chills and/or fever over 101 F   Persistent vomiting and/or vomiting blood   Severe abdominal pain   Severe chest pain   Black, tarry stools   Bleeding- more than one tablespoon   Any other symptom or condition that you feel may need urgent attention  Your doctor recommends these additional instructions:  If any biopsies were taken, your doctors clinic will contact you in 1 to 2   weeks with any results.  We are waiting for your pathology results.   Your physician has recommended a repeat colonoscopy in five years for   surveillance based on pathology results.   You are being discharged to home.  For questions, problems or results please call your physician - Brett Mayorga MD at Work:  [phone].  EMERGENCY PHONE NUMBER: [phone], LAB RESULTS: [phone]  IF A COMPLICATION OR EMERGENCY SITUATION ARISES AND YOU ARE UNABLE TO REACH   YOUR PHYSICIAN - GO DIRECTLY TO THE EMERGENCY ROOM.  ___________________________________________  Nurse Signature  ___________________________________________  Patient/Designated Responsible Party Signature  Brett Mayorga MD  1/31/2023 11:17:26 AM  This report has been verified and signed electronically.  Dear patient,  As a result of recent federal legislation (The Federal Cures Act), you may   receive lab or pathology results from your procedure in your [facility]   account before your physician is able to contact you. Your physician or   their representative will relay the results to you with their   recommendations at their soonest availability.  Thank you.  PROVATION

## 2023-02-07 LAB
FINAL PATHOLOGIC DIAGNOSIS: NORMAL
GROSS: NORMAL
Lab: NORMAL

## 2024-09-15 PROBLEM — E66.01 MORBID OBESITY: Chronic | Status: ACTIVE | Noted: 2018-07-23

## 2024-09-15 PROBLEM — G47.33 OSA (OBSTRUCTIVE SLEEP APNEA): Chronic | Status: ACTIVE | Noted: 2022-12-21

## 2025-06-12 PROBLEM — R19.7 VOMITING AND DIARRHEA: Status: ACTIVE | Noted: 2025-06-12

## 2025-06-12 PROBLEM — I95.1 ORTHOSTATIC HYPOTENSION: Status: ACTIVE | Noted: 2025-06-12

## 2025-06-12 PROBLEM — R11.10 VOMITING AND DIARRHEA: Status: ACTIVE | Noted: 2025-06-12

## 2025-06-19 ENCOUNTER — TELEPHONE (OUTPATIENT)
Dept: NEUROLOGY | Facility: CLINIC | Age: 70
End: 2025-06-19
Payer: MEDICARE

## 2025-06-26 ENCOUNTER — OFFICE VISIT (OUTPATIENT)
Dept: NEUROLOGY | Facility: CLINIC | Age: 70
End: 2025-06-26
Payer: MEDICARE

## 2025-06-26 VITALS
RESPIRATION RATE: 14 BRPM | DIASTOLIC BLOOD PRESSURE: 70 MMHG | WEIGHT: 265.75 LBS | BODY MASS INDEX: 40.41 KG/M2 | SYSTOLIC BLOOD PRESSURE: 122 MMHG

## 2025-06-26 DIAGNOSIS — R55 SYNCOPE AND COLLAPSE: Primary | ICD-10-CM

## 2025-06-26 DIAGNOSIS — R42 DIZZINESS: ICD-10-CM

## 2025-06-26 DIAGNOSIS — R26.89 IMBALANCE: ICD-10-CM

## 2025-06-26 PROCEDURE — 4010F ACE/ARB THERAPY RXD/TAKEN: CPT | Mod: CPTII,S$GLB,,

## 2025-06-26 PROCEDURE — 3008F BODY MASS INDEX DOCD: CPT | Mod: CPTII,S$GLB,,

## 2025-06-26 PROCEDURE — 3074F SYST BP LT 130 MM HG: CPT | Mod: CPTII,S$GLB,,

## 2025-06-26 PROCEDURE — 3078F DIAST BP <80 MM HG: CPT | Mod: CPTII,S$GLB,,

## 2025-06-26 PROCEDURE — 99204 OFFICE O/P NEW MOD 45 MIN: CPT | Mod: S$GLB,,,

## 2025-06-26 PROCEDURE — 1159F MED LIST DOCD IN RCRD: CPT | Mod: CPTII,S$GLB,,

## 2025-06-26 PROCEDURE — 3044F HG A1C LEVEL LT 7.0%: CPT | Mod: CPTII,S$GLB,,

## 2025-06-26 PROCEDURE — 1126F AMNT PAIN NOTED NONE PRSNT: CPT | Mod: CPTII,S$GLB,,

## 2025-06-26 PROCEDURE — 1100F PTFALLS ASSESS-DOCD GE2>/YR: CPT | Mod: CPTII,S$GLB,,

## 2025-06-26 PROCEDURE — 99999 PR PBB SHADOW E&M-EST. PATIENT-LVL III: CPT | Mod: PBBFAC,,,

## 2025-06-26 PROCEDURE — 3288F FALL RISK ASSESSMENT DOCD: CPT | Mod: CPTII,S$GLB,,

## 2025-06-26 NOTE — PROGRESS NOTES
NEUROLOGY  Outpatient Consultation Visit   [facility]  [address] [facility], [place] [address]  [phone] (office) / [phone] (fax)    Patient Name:  Scott Renteria  :  1955  MR #:  [id number]  Acct #:  [id number]    Date of Neurology Consult: 2025  Name of Provider: GUNNAR Valdovinos    Other Physicians:  SCOTT Blunt Jr., MD (Primary Care Physician); Self, Aaareferral (Referring)      Chief Complaint: Hospital Follow Up      History of Present Illness (HPI):  Scott Renteria is a 70 y.o. male with a PMHX of CAD s/p CABG, gout, HTN, hypothyroidism, pre diabetes, BPH, and  HARDEEP. Patient is here with wife who also contributes to the following history. Patient presents to clinic for evaluation of dizziness.     Patient was hospitalized [facility] following a syncopal episode.  He was receiving semaglutide from compounded pharmacy and increased this dose.  He was having multiple episodes of nausea and vomiting for approximately 24 hours, he tried to go to the bathroom to vomit and passed out.  He did hit his head.  EMS was called and he was taken to the ER. He was found to have right parieto-occipital scalp soft tissue swelling and hematoma.  His Plavix was held for 5 days and has since been resumed.  He denies any further dizziness, vision changes, speech changes, or altered mental status.  His staples were removed.  He has stopped semaglutide.     Inpatient workup revealed elevated troponins which were suspected to be secondary to severe dehydration and improved with IV rehydration. He was evaluated by Cardiology in the hospital. Orthostatics were positive.  He has chronic lightheadedness with position changes. He has been recommended conservative measures for orthostasis. He has a follow up with Cardiology.     He had a fall due a syncopal episode. He had a fall several years ago. Denies imbalance.     He was able to work out this morning.     Hospital Course per D/C  Summary 6/13/2025:      Mr. Scott Renteria was admitted to the Clinical Decision Unit (CDU) for further observation and medical management following recent episodes of nausea, vomiting, and generalized weakness. The patient reported a recent dosage increase in his semaglutide injections, which was suspected to have contributed to his symptoms.  During his stay, he was treated with intravenous fluid resuscitation and antiemetics, with gradual clinical improvement. However, serial cardiac enzymes revealed trending elevations in high-sensitivity troponins. Inpatient Cardiology was consulted, and after thorough evaluation, no evidence of acute ischemia or arrhythmia was identified. It was determined that the troponin elevation was likely due to demand ischemia or dehydration-related cardiac strain, as levels began to decline with adequate hydration.  A transthoracic echocardiogram was performed and reviewed by Dr. Muir, who found no acute abnormalities. Mr. Renteria remained hemodynamically stable, and his symptoms resolved. He was medically cleared for discharge and instructed to resume his home medications. He was highly recommended to stop or reduce his semaglutide medication.   He will follow up with his primary Cardiologist within one week. He will follow up with his PCP in 5 days for staple removal of scalp laceration. At the time of discharge, Mr. Renteria is alert, ambulatory, and without fall risk. He is safe for discharge to home.    Past Medical, Surgical, Family & Social History:   Past Medical History:   Diagnosis Date    Coronary artery disease     Gout     Hypertension     Hypothyroidism     HARDEEP (obstructive sleep apnea) 12/21/2022     Past Surgical History:   Procedure Laterality Date    CARDIAC CATHETERIZATION      COLONOSCOPY N/A 1/31/2023    Procedure: COLONOSCOPY;  Surgeon: Brett Mayorga MD;  Location: [facility];  Service: Endoscopy;  Laterality: N/A;    CORONARY ARTERY BYPASS GRAFT      open  heart surgery  2001    TONSILLECTOMY       Family History   Problem Relation Name Age of Onset    Heart disease Mother          stents    Thyroid disease Mother      Alcohol abuse Father       Alcohol use:  reports no history of alcohol use.   (Of note, 0.6 oz = 1 beer or 6 oz = 10 beers).  Tobacco use:  reports that he has never smoked. He has never used smokeless tobacco.  Street drug use:  reports no history of drug use.  Allergies: Statins-hmg-coa reductase inhibitors.    Home Medications:   Current Medications[1]    Physical Examination:  /70 (BP Location: Right arm, Patient Position: Sitting)   Resp 14   Wt 120.5 kg (265 lb 12.2 oz)   BMI 40.41 kg/m²     Neurological exam:  Mental status: Awake and alert.    Speech/Language: Fluent and appropriate. No dysarthria or aphasia on conversation. Able to follow complex commands.   Cranial nerves (II-XII): Visual fields full. Pupils equal round and reactive to light, extraocular movements intact, no ptosis, no nystagmus. Facial sensation and symmetry intact bilaterally. Hearing grossly intact. Palate mobile and midline. Shoulder shrug normal bilaterally. Normal tongue protrusion.   Motor: 5 out of 5 strength throughout the upper and lower extremities bilaterally. Normal bulk and tone. No abnormal movements noted. No drift appreciated.   DTR: 1+ at the knees and biceps bilaterally.  Coordination: Finger-nose-finger testing intact bilaterally. JEANINE normal bilaterally.   Gait: Normal gait, including heel, toe and tandem.      Diagnostic Data Reviewed:   I have personally reviewed provider notes, labs and imaging made available to me today.     Imaging:  CT Head Head 6/12/2025:  FINDINGS:  There is right parieto-occipital scalp soft tissue swelling and hematoma with laceration with mild subcutaneous emphysema.  Gray-white matter differentiation is within normal limits. There is chronic involutional change.  There is chronic white matter microischemic change.   "There is intracranial atherosclerosis.  No acute intracranial hemorrhage, extra-axial fluid collection, hydrocephalus, mass effect, or midline shift is noted.  No large vessel territory acute ischemia is identified.  Visualized paranasal sinuses are clear.  Visualized mastoid air cells are clear.  No acute displaced calvarial fracture is identified.     Impression:     1. Right parieto-occipital scalp soft tissue swelling and hematoma with laceration.  2. No acute intracranial abnormalities identified.     Cardiac:  EKG 6/13/2025:  Test Reason : R07.9,    Vent. Rate :  60 BPM     Atrial Rate :  60 BPM     P-R Int : 230 ms          QRS Dur : 108 ms      QT Int : 446 ms       P-R-T Axes :   1  59  36 degrees    QTcB Int : 446 ms    Sinus rhythm with 1st degree A-V block  Otherwise normal ECG       Labs:  Lab Results   Component Value Date    WBC 11.56 06/13/2025    HGB 14.4 06/13/2025    HCT 42.8 06/13/2025     06/13/2025    MCV 87 06/13/2025    RDW 14.4 06/13/2025     Lab Results   Component Value Date     06/13/2025    K 3.4 (L) 06/13/2025     06/13/2025    CO2 27 06/13/2025    BUN 22 06/13/2025    CREATININE 0.99 06/13/2025    GLU 91 06/13/2025    CALCIUM 8.3 (L) 06/13/2025    MG 1.7 06/12/2025    PHOS 3.3 02/21/2022     Lab Results   Component Value Date    PROT 7.7 06/12/2025    ALBUMIN 4.3 06/12/2025    BILITOT 1.3 (H) 06/12/2025    AST 19 06/12/2025    ALKPHOS 55 06/12/2025    ALT 12 06/12/2025     Lab Results   Component Value Date    INR 1.0 06/12/2025     Lab Results   Component Value Date    CHOL 159 11/18/2024    HDL 43 11/18/2024    LDLCALC 72.8 11/18/2024    TRIG 216 (H) 11/18/2024    CHOLHDL 27.0 11/18/2024     Lab Results   Component Value Date    HGBA1C 5.2 05/27/2025      Lab Results   Component Value Date    [id number] 427 06/14/2024     No results found for: "FOLATE"  Lab Results   Component Value Date    TSH 2.630 11/18/2024     No results found for: "VITAMINB1"  No results " "found for: "RPR"  No results found for: "ANDREW"  No components found for: "HEPATITISCANTIBODY"  No components found for: "HIV 1/2 AG/AB"  Lab Results   Component Value Date    CRP 0.70 04/03/2020     No components found for: "SEDIMENTATIONRATE"      Assessment and Plan:  Scott Renteria is a 70 y.o. male here for follow up of syncopal episode.     Problem List Items Addressed This Visit       Syncope and collapse - Primary    Current Assessment & Plan   71 y/o with isolated syncopal episode in setting of severe dehydration secondary to vomiting   -CT with scalp hematoma  -Normal Neurological exam in clinic today  -He has resumed his plavix   -Neurologically at baseline, no need for repeat imaging at this time.    -Orthostatics positive in hospital. Chronic positional lightheadedness. Seen by cardiology with unremarkable workup, has follow up with Cardiologist. He has been recommended conservative measures for orthostasis.   -No further falls  -ED precautions discussed at length.           Other Visit Diagnoses         Dizziness          Imbalance                Important to note, also  has a past medical history of Coronary artery disease, Gout, Hypertension, Hypothyroidism, and HARDEEP (obstructive sleep apnea) (12/21/2022).      The patient will return to clinic PRN.   Thank you very much for the opportunity to assist in this patient's care.    If you have any questions or concerns, please do not hesitate to contact me at any time.    Sincerely,       GUNNAR Valdovinos  [facility] [place]     Time Spent: I spent a total of 53 minutes on the day of the visit.This includes face to face time and non-face to face time preparing to see the patient (eg, review of tests), Obtaining and/or reviewing separately obtained history, Documenting clinical information in the electronic or other health record, Independently interpreting resultsand communicating results to the patient/family/caregiver, or Care " coordination.           [1]   Current Outpatient Medications:     acetaminophen (TYLENOL ORAL), Take 500 mg by mouth., Disp: , Rfl:     allopurinoL (ZYLOPRIM) 300 MG tablet, TAKE 2 BY MOUTH EVERY DAY, Disp: 180 tablet, Rfl: 1    ascorbic acid (VITAMIN C ORAL), Take by mouth., Disp: , Rfl:     ASHWAGANDHA EXTRACT ORAL, Take by mouth., Disp: , Rfl:     azelastine (ASTELIN) 137 mcg (0.1 %) nasal spray, 1-2 sprays by Nasal route every evening., Disp: , Rfl:     clopidogrel (PLAVIX) 75 mg tablet, Take 75 mg by mouth once daily., Disp: , Rfl:     fosinopril (MONOPRIL) 20 MG tablet, Take 20 mg by mouth once daily., Disp: , Rfl:     hydroCHLOROthiazide (HYDRODIURIL) 25 MG tablet, Take 1 tablet (25 mg total) by mouth once daily., Disp: 90 tablet, Rfl: 0    ibuprofen (ADVIL,MOTRIN) 400 MG tablet, Take 1 tablet (400 mg total) by mouth every 6 to 8 hours as needed for Other., Disp: 90 tablet, Rfl: 2    levothyroxine (SYNTHROID) 125 MCG tablet, TAKE 1 TABLET BY MOUTH EVERY DAY BEFORE BREAKFAST, Disp: 30 tablet, Rfl: 6    multivitamin (THERAGRAN) per tablet, Take 1 tablet by mouth once daily., Disp: , Rfl:

## 2025-06-26 NOTE — ASSESSMENT & PLAN NOTE
69 y/o with isolated syncopal episode in setting of severe dehydration secondary to vomiting   -CT with scalp hematoma  -Normal Neurological exam in clinic today  -He has resumed his plavix   -Neurologically at baseline, no need for repeat imaging at this time.    -Orthostatics positive in hospital. Chronic positional lightheadedness. Seen by cardiology with unremarkable workup, has follow up with Cardiologist. He has been recommended conservative measures for orthostasis.   -No further falls  -ED precautions discussed at length.